# Patient Record
Sex: FEMALE | Race: WHITE | NOT HISPANIC OR LATINO | Employment: FULL TIME | URBAN - METROPOLITAN AREA
[De-identification: names, ages, dates, MRNs, and addresses within clinical notes are randomized per-mention and may not be internally consistent; named-entity substitution may affect disease eponyms.]

---

## 2020-09-16 NOTE — PROGRESS NOTES
CC: Well Woman -hysterectomy with BSO    HPI:  Patient is a 59 y.o.   who presents for her well woman exam today.  History reviewed with patient and changes noted.  Patient without complaints or concerns today.    Past Medical History:   Diagnosis Date    Asthma     Atrophic vaginitis     insuff paps- used intrarosa    Colon polyps     DM (diabetes mellitus)     GERD (gastroesophageal reflux disease)     HTN (hypertension)     Hypercholesterolemia     Menopausal state 2010    hyst/bso    Psoriasis        Past Surgical History:   Procedure Laterality Date    BILATERAL TUBAL LIGATION      BREAST BIOPSY  2018    benign w/ clip [facility]    CHOLECYSTECTOMY      DILATION AND CURETTAGE OF UTERUS      Ear surgery, unspecified Right 2018    LAPAROSCOPY-ASSISTED VAGINAL HYSTERECTOMY  2010    with BSO    OVARIAN CYST REMOVAL         Social History     Tobacco Use    Smoking status: Never Smoker    Smokeless tobacco: Never Used   Substance Use Topics    Alcohol use: Yes     Comment: rarely    Drug use: Never       Family History   Problem Relation Age of Onset    Cancer Brother 58        kidney/urinary tract       Review of patient's allergies indicates:   Allergen Reactions    Pcn [penicillins]          Current Outpatient Medications:     budesonide-formoterol 160-4.5 mcg (SYMBICORT) 160-4.5 mcg/actuation HFAA, Inhale 2 puffs into the lungs every 12 (twelve) hours. Controller, Disp: , Rfl:     empagliflozin-metformin (SYNJARDY) 12.5-1,000 mg Tab, Take by mouth 2 (two) times daily., Disp: , Rfl:     glyBURIDE (DIABETA) 5 MG tablet, Take 5 mg by mouth daily with breakfast., Disp: , Rfl:     losartan (COZAAR) 50 MG tablet, Take 50 mg by mouth once daily., Disp: , Rfl:     omeprazole (PRILOSEC) 40 MG capsule, Take 40 mg by mouth once daily., Disp: , Rfl:     atorvastatin (LIPITOR) 40 MG tablet, , Disp: , Rfl:     losartan-hydrochlorothiazide 50-12.5 mg (HYZAAR) 50-12.5 mg per tablet, ,  Disp: , Rfl:     OB History        2    Para   2    Term   2            AB        Living   2       SAB        TAB        Ectopic        Multiple        Live Births   2                  GYN HX:    HX ABNL PAPS:  no abnormals    HX OF STDS:  none    HRT USE: yes- vaginal intrarosa    HYSTERECTOMY SINCE:  for bleeding and fibroids with BSO    HEALTH MAINTENANCE:  (PCP-Brian Quintana MD)    LAST ANNUAL/ PAP:   2019       LAST PAP:  neg    LAST MMG:  2019  normal at [facility]    LAST LABS:  in the last few months with pcp    LAST COLON: 2016- polyps- Q 5 yrs- w/ Dr. Vargas    LAST DEXA: will do with mmg at [facility]    ROS:  Review of Systems   Constitutional: Negative for activity change, appetite change, chills, fatigue, fever and unexpected weight change.   Respiratory: Negative for cough and shortness of breath.    Cardiovascular: Negative for chest pain and leg swelling.   Gastrointestinal: Negative for abdominal pain, bloating, blood in stool, constipation, diarrhea, nausea and vomiting.   Endocrine: Negative for hair loss and hot flashes.   Genitourinary: Negative for decreased libido, dyspareunia, dysuria, flank pain, frequency, genital sores, hematuria, hot flashes, pelvic pain, urgency, vaginal bleeding, vaginal discharge, vaginal pain, urinary incontinence, postmenopausal bleeding, vaginal dryness and vaginal odor.   Musculoskeletal: Negative for arthralgias and joint swelling.   Integumentary:  Negative for rash, acne, mole/lesion, breast mass, nipple discharge, breast skin changes and breast tenderness.   Neurological: Negative for headaches.   Psychiatric/Behavioral: Negative for depression and sleep disturbance. The patient is not nervous/anxious.    Breast: Negative for asymmetry, lump, mass, nipple discharge, skin changes and tenderness      VITALS:  No LMP recorded. Patient has had a hysterectomy.  Vitals:    20 1433   BP: 105/61   Pulse: 78   Weight: 75.8  "kg (167 lb 3.2 oz)   Height: 5' 9" (1.753 m)     Body mass index is 24.69 kg/m².     PHYSICAL EXAM:  Physical Exam:   Constitutional: She is oriented to person, place, and time. She appears well-developed and well-nourished. No distress.    HENT:   Head: Normocephalic and atraumatic.     Neck: No thyroid mass present.    Cardiovascular: Normal rate.     Pulmonary/Chest: Effort normal. No respiratory distress. She exhibits no deformity. Right breast exhibits no inverted nipple, no mass, no nipple discharge, no skin change, no tenderness, no bleeding and no swelling. Left breast exhibits no inverted nipple, no mass, no nipple discharge, no skin change, no tenderness, no bleeding and no swelling. Breasts are symmetrical.        Abdominal: Soft. Normal appearance. She exhibits no distension. There is no abdominal tenderness.     Genitourinary:    Vagina normal.      Pelvic exam was performed with patient supine.   There is no rash, tenderness, lesion or injury on the right labia. There is no rash, tenderness, lesion or injury on the left labia. Uterus is absent. Right adnexum displays no mass, no tenderness and no fullness. Left adnexum displays no mass, no tenderness and no fullness. No erythema, tenderness, bleeding, rectocele, cystocele or unspecified prolapse of vaginal walls in the vagina.    No foreign body in the vagina.      No signs of injury in the vagina.   Vaginal cuff normal.Labial bartholins normal.Cervix exhibits absence. negative for vaginal discharge              Neurological: She is alert and oriented to person, place, and time.    Skin: Skin is warm and dry. No lesion and no rash noted.    Psychiatric: She has a normal mood and affect. Her speech is normal and behavior is normal. Judgment and thought content normal.     *female chaperone present for exam    ASSESSMENT and PLAN:  Encounter for well woman exam with routine gynecological exam  -     Liquid-based pap smear, screening    Menopausal " state    Atrophic vaginitis       FOLLOWUP:  Follow up in about 1 year (around 9/17/2021) for wwe.     COUNSELING:  Patient was counseled today on A.C.S. Pap guidelines and recommendations for yearly pelvic exam, monthly self breast exams, annual mammograms, and screening colonoscopy starting at age 50. Encouraged patient to see her PCP for other health maintenance.

## 2020-09-17 ENCOUNTER — OFFICE VISIT (OUTPATIENT)
Dept: OBSTETRICS AND GYNECOLOGY | Facility: CLINIC | Age: 60
End: 2020-09-17
Payer: COMMERCIAL

## 2020-09-17 VITALS
HEART RATE: 78 BPM | SYSTOLIC BLOOD PRESSURE: 105 MMHG | WEIGHT: 167.19 LBS | BODY MASS INDEX: 24.76 KG/M2 | HEIGHT: 69 IN | DIASTOLIC BLOOD PRESSURE: 61 MMHG

## 2020-09-17 DIAGNOSIS — N95.1 MENOPAUSAL STATE: ICD-10-CM

## 2020-09-17 DIAGNOSIS — N95.2 ATROPHIC VAGINITIS: ICD-10-CM

## 2020-09-17 DIAGNOSIS — Z01.419 ENCOUNTER FOR WELL WOMAN EXAM WITH ROUTINE GYNECOLOGICAL EXAM: Primary | ICD-10-CM

## 2020-09-17 PROBLEM — K21.9 GASTRIC REFLUX: Status: ACTIVE | Noted: 2020-09-17

## 2020-09-17 PROBLEM — E78.5 HYPERLIPIDEMIA: Status: ACTIVE | Noted: 2017-01-01

## 2020-09-17 PROBLEM — N84.9 POLYP OF FEMALE GENITAL TRACT, UNSPECIFIED: Status: ACTIVE | Noted: 2020-09-17

## 2020-09-17 PROCEDURE — 3008F PR BODY MASS INDEX (BMI) DOCUMENTED: ICD-10-PCS | Mod: CPTII,S$GLB,, | Performed by: OBSTETRICS & GYNECOLOGY

## 2020-09-17 PROCEDURE — 99396 PR PREVENTIVE VISIT,EST,40-64: ICD-10-PCS | Mod: S$GLB,,, | Performed by: OBSTETRICS & GYNECOLOGY

## 2020-09-17 PROCEDURE — 99396 PREV VISIT EST AGE 40-64: CPT | Mod: S$GLB,,, | Performed by: OBSTETRICS & GYNECOLOGY

## 2020-09-17 PROCEDURE — 3008F BODY MASS INDEX DOCD: CPT | Mod: CPTII,S$GLB,, | Performed by: OBSTETRICS & GYNECOLOGY

## 2020-09-17 RX ORDER — EMPAGLIFLOZIN AND METFORMIN HYDROCHLORIDE 12.5; 1 MG/1; MG/1
TABLET ORAL 2 TIMES DAILY
COMMUNITY
End: 2021-09-27

## 2020-09-17 RX ORDER — LOSARTAN POTASSIUM AND HYDROCHLOROTHIAZIDE 12.5; 5 MG/1; MG/1
TABLET ORAL
COMMUNITY
End: 2021-09-27

## 2020-09-17 RX ORDER — ATORVASTATIN CALCIUM 40 MG/1
TABLET, FILM COATED ORAL
COMMUNITY
Start: 2020-06-18 | End: 2023-05-15

## 2021-09-27 ENCOUNTER — OFFICE VISIT (OUTPATIENT)
Dept: OBSTETRICS AND GYNECOLOGY | Facility: CLINIC | Age: 61
End: 2021-09-27
Payer: COMMERCIAL

## 2021-09-27 VITALS
WEIGHT: 170.81 LBS | DIASTOLIC BLOOD PRESSURE: 83 MMHG | HEART RATE: 96 BPM | SYSTOLIC BLOOD PRESSURE: 113 MMHG | BODY MASS INDEX: 25.3 KG/M2 | HEIGHT: 69 IN

## 2021-09-27 DIAGNOSIS — N95.1 MENOPAUSAL STATE: ICD-10-CM

## 2021-09-27 DIAGNOSIS — Z01.419 ENCOUNTER FOR WELL WOMAN EXAM WITH ROUTINE GYNECOLOGICAL EXAM: Primary | ICD-10-CM

## 2021-09-27 DIAGNOSIS — B37.2 YEAST DERMATITIS: ICD-10-CM

## 2021-09-27 DIAGNOSIS — N95.2 ATROPHIC VAGINITIS: ICD-10-CM

## 2021-09-27 PROCEDURE — 1159F MED LIST DOCD IN RCRD: CPT | Mod: CPTII,S$GLB,, | Performed by: OBSTETRICS & GYNECOLOGY

## 2021-09-27 PROCEDURE — 99396 PR PREVENTIVE VISIT,EST,40-64: ICD-10-PCS | Mod: S$GLB,,, | Performed by: OBSTETRICS & GYNECOLOGY

## 2021-09-27 PROCEDURE — 3008F BODY MASS INDEX DOCD: CPT | Mod: CPTII,S$GLB,, | Performed by: OBSTETRICS & GYNECOLOGY

## 2021-09-27 PROCEDURE — 3074F PR MOST RECENT SYSTOLIC BLOOD PRESSURE < 130 MM HG: ICD-10-PCS | Mod: CPTII,S$GLB,, | Performed by: OBSTETRICS & GYNECOLOGY

## 2021-09-27 PROCEDURE — 4010F PR ACE/ARB THEARPY RXD/TAKEN: ICD-10-PCS | Mod: CPTII,S$GLB,, | Performed by: OBSTETRICS & GYNECOLOGY

## 2021-09-27 PROCEDURE — 1160F RVW MEDS BY RX/DR IN RCRD: CPT | Mod: CPTII,S$GLB,, | Performed by: OBSTETRICS & GYNECOLOGY

## 2021-09-27 PROCEDURE — 3008F PR BODY MASS INDEX (BMI) DOCUMENTED: ICD-10-PCS | Mod: CPTII,S$GLB,, | Performed by: OBSTETRICS & GYNECOLOGY

## 2021-09-27 PROCEDURE — 1159F PR MEDICATION LIST DOCUMENTED IN MEDICAL RECORD: ICD-10-PCS | Mod: CPTII,S$GLB,, | Performed by: OBSTETRICS & GYNECOLOGY

## 2021-09-27 PROCEDURE — 4010F ACE/ARB THERAPY RXD/TAKEN: CPT | Mod: CPTII,S$GLB,, | Performed by: OBSTETRICS & GYNECOLOGY

## 2021-09-27 PROCEDURE — 3074F SYST BP LT 130 MM HG: CPT | Mod: CPTII,S$GLB,, | Performed by: OBSTETRICS & GYNECOLOGY

## 2021-09-27 PROCEDURE — 1160F PR REVIEW ALL MEDS BY PRESCRIBER/CLIN PHARMACIST DOCUMENTED: ICD-10-PCS | Mod: CPTII,S$GLB,, | Performed by: OBSTETRICS & GYNECOLOGY

## 2021-09-27 PROCEDURE — 3079F PR MOST RECENT DIASTOLIC BLOOD PRESSURE 80-89 MM HG: ICD-10-PCS | Mod: CPTII,S$GLB,, | Performed by: OBSTETRICS & GYNECOLOGY

## 2021-09-27 PROCEDURE — 3079F DIAST BP 80-89 MM HG: CPT | Mod: CPTII,S$GLB,, | Performed by: OBSTETRICS & GYNECOLOGY

## 2021-09-27 PROCEDURE — 99396 PREV VISIT EST AGE 40-64: CPT | Mod: S$GLB,,, | Performed by: OBSTETRICS & GYNECOLOGY

## 2021-09-27 RX ORDER — FLUCONAZOLE 200 MG/1
200 TABLET ORAL DAILY
Qty: 3 TABLET | Refills: 6 | Status: SHIPPED | OUTPATIENT
Start: 2021-09-27 | End: 2021-09-30

## 2021-09-27 RX ORDER — CLOTRIMAZOLE AND BETAMETHASONE DIPROPIONATE 10; .64 MG/G; MG/G
CREAM TOPICAL
COMMUNITY
Start: 2021-08-02

## 2021-09-27 RX ORDER — CLOBETASOL PROPIONATE 0.05 G/ML
SPRAY TOPICAL
COMMUNITY
Start: 2021-08-20

## 2021-09-27 RX ORDER — EMPAGLIFLOZIN AND METFORMIN HYDROCHLORIDE 5; 1000 MG/1; MG/1
TABLET ORAL
COMMUNITY
Start: 2021-09-09

## 2021-09-27 RX ORDER — PRASTERONE 6.5 MG/1
6.5 INSERT VAGINAL NIGHTLY
Qty: 28 EACH | Refills: 12 | Status: SHIPPED | OUTPATIENT
Start: 2021-09-27 | End: 2023-05-15

## 2023-05-15 ENCOUNTER — OFFICE VISIT (OUTPATIENT)
Dept: GASTROENTEROLOGY | Facility: CLINIC | Age: 63
End: 2023-05-15
Payer: COMMERCIAL

## 2023-05-15 VITALS
OXYGEN SATURATION: 99 % | HEIGHT: 68 IN | HEART RATE: 76 BPM | WEIGHT: 134 LBS | SYSTOLIC BLOOD PRESSURE: 147 MMHG | BODY MASS INDEX: 20.31 KG/M2 | DIASTOLIC BLOOD PRESSURE: 75 MMHG

## 2023-05-15 DIAGNOSIS — Z86.010 HISTORY OF COLON POLYPS: ICD-10-CM

## 2023-05-15 DIAGNOSIS — K21.9 GASTROESOPHAGEAL REFLUX DISEASE, UNSPECIFIED WHETHER ESOPHAGITIS PRESENT: ICD-10-CM

## 2023-05-15 DIAGNOSIS — R13.12 OROPHARYNGEAL DYSPHAGIA: Primary | ICD-10-CM

## 2023-05-15 PROCEDURE — 4010F ACE/ARB THERAPY RXD/TAKEN: CPT | Mod: CPTII,S$GLB,, | Performed by: INTERNAL MEDICINE

## 2023-05-15 PROCEDURE — 3078F DIAST BP <80 MM HG: CPT | Mod: CPTII,S$GLB,, | Performed by: INTERNAL MEDICINE

## 2023-05-15 PROCEDURE — 1160F PR REVIEW ALL MEDS BY PRESCRIBER/CLIN PHARMACIST DOCUMENTED: ICD-10-PCS | Mod: CPTII,S$GLB,, | Performed by: INTERNAL MEDICINE

## 2023-05-15 PROCEDURE — 99203 OFFICE O/P NEW LOW 30 MIN: CPT | Mod: S$GLB,,, | Performed by: INTERNAL MEDICINE

## 2023-05-15 PROCEDURE — 1159F MED LIST DOCD IN RCRD: CPT | Mod: CPTII,S$GLB,, | Performed by: INTERNAL MEDICINE

## 2023-05-15 PROCEDURE — 3008F BODY MASS INDEX DOCD: CPT | Mod: CPTII,S$GLB,, | Performed by: INTERNAL MEDICINE

## 2023-05-15 PROCEDURE — 3077F PR MOST RECENT SYSTOLIC BLOOD PRESSURE >= 140 MM HG: ICD-10-PCS | Mod: CPTII,S$GLB,, | Performed by: INTERNAL MEDICINE

## 2023-05-15 PROCEDURE — 4010F PR ACE/ARB THEARPY RXD/TAKEN: ICD-10-PCS | Mod: CPTII,S$GLB,, | Performed by: INTERNAL MEDICINE

## 2023-05-15 PROCEDURE — 3077F SYST BP >= 140 MM HG: CPT | Mod: CPTII,S$GLB,, | Performed by: INTERNAL MEDICINE

## 2023-05-15 PROCEDURE — 1159F PR MEDICATION LIST DOCUMENTED IN MEDICAL RECORD: ICD-10-PCS | Mod: CPTII,S$GLB,, | Performed by: INTERNAL MEDICINE

## 2023-05-15 PROCEDURE — 3008F PR BODY MASS INDEX (BMI) DOCUMENTED: ICD-10-PCS | Mod: CPTII,S$GLB,, | Performed by: INTERNAL MEDICINE

## 2023-05-15 PROCEDURE — 3078F PR MOST RECENT DIASTOLIC BLOOD PRESSURE < 80 MM HG: ICD-10-PCS | Mod: CPTII,S$GLB,, | Performed by: INTERNAL MEDICINE

## 2023-05-15 PROCEDURE — 1160F RVW MEDS BY RX/DR IN RCRD: CPT | Mod: CPTII,S$GLB,, | Performed by: INTERNAL MEDICINE

## 2023-05-15 PROCEDURE — 99203 PR OFFICE/OUTPT VISIT, NEW, LEVL III, 30-44 MIN: ICD-10-PCS | Mod: S$GLB,,, | Performed by: INTERNAL MEDICINE

## 2023-05-15 RX ORDER — SOD SULF/POT CHLORIDE/MAG SULF 1.479 G
12 TABLET ORAL DAILY
Qty: 24 TABLET | Refills: 0 | Status: SHIPPED | OUTPATIENT
Start: 2023-05-15

## 2023-05-15 RX ORDER — FLUTICASONE PROPIONATE 50 MCG
SPRAY, SUSPENSION (ML) NASAL
COMMUNITY
Start: 2023-01-23

## 2023-05-15 RX ORDER — BLOOD SUGAR DIAGNOSTIC
STRIP MISCELLANEOUS
COMMUNITY
Start: 2023-04-24

## 2023-05-15 RX ORDER — CYCLOBENZAPRINE HCL 5 MG
TABLET ORAL
COMMUNITY
Start: 2023-02-24 | End: 2023-12-07

## 2023-05-15 RX ORDER — DOXYCYCLINE 50 MG/1
CAPSULE ORAL
COMMUNITY
Start: 2023-01-09 | End: 2023-12-07

## 2023-05-15 RX ORDER — TIRZEPATIDE 5 MG/.5ML
INJECTION, SOLUTION SUBCUTANEOUS
COMMUNITY
Start: 2023-04-03

## 2023-05-15 NOTE — PATIENT INSTRUCTIONS
Schedule EGD w/dil/Colonoscopy.    Please notify my office if you have not been contacted within two weeks after any procedures, submitting any samples (biopsies, blood, stool, urine, etc.) or after any imaging (X-ray, CT, MRI, etc.).

## 2023-05-15 NOTE — LETTER
May 15, 2023        Brian Quintana MD  [address]  [place] [address]             [facility]  [address] [address]  Phone: [phone]  Fax: [phone]   Patient: Chantelle Blair   MR Number: [id number]   YOB: 1960   Date of Visit: 5/15/2023       Dear Dr. Quintana:    Thank you for referring Chantelle Blair to me for evaluation. Attached you will find relevant portions of my assessment and plan of care.    If you have questions, please do not hesitate to call me. I look forward to following Chantelle Blair along with you.    Sincerely,      Fortino Vargas MD            CC  No Recipients    Enclosure

## 2023-05-15 NOTE — PROGRESS NOTES
Clinic Note    Reason for visit:  The primary encounter diagnosis was Oropharyngeal dysphagia. Diagnoses of Gastroesophageal reflux disease, unspecified whether esophagitis present and History of colon polyps were also pertinent to this visit.    PCP: Brian Quintana       HPI:  This is a 62 y.o. female who was last seen in 2016. Patient reports overall doing well. She is due for colonoscopy. She denies blood in stool. She does report intermittent oropharyngeal dysphagia with solids. She takes omeprazole 40 mg daily.       Colonoscopy 3/14/2016: polyps ok-repeat colon in 5 years    Review of Systems   Constitutional:  Negative for fatigue, fever and unexpected weight change.   HENT:  Positive for trouble swallowing. Negative for mouth sores, postnasal drip and sore throat.    Eyes:  Negative for pain, discharge and eye dryness.   Respiratory:  Negative for apnea, cough, choking, chest tightness, shortness of breath and wheezing.    Cardiovascular:  Negative for chest pain, palpitations and leg swelling.   Gastrointestinal:  Negative for abdominal distention, abdominal pain, anal bleeding, blood in stool, change in bowel habit, constipation, diarrhea, nausea, rectal pain, vomiting, reflux, fecal incontinence and change in bowel habit.   Genitourinary:  Negative for bladder incontinence, dysuria and hematuria.   Musculoskeletal:  Negative for arthralgias, back pain and joint swelling.   Integumentary:  Negative for color change and rash.   Allergic/Immunologic: Negative for environmental allergies and food allergies.   Neurological:  Negative for seizures and headaches.   Hematological:  Negative for adenopathy. Does not bruise/bleed easily.      Past Medical History:   Diagnosis Date    Asthma     Atrophic vaginitis     insuff paps- used intrarosa    Colon polyps     DM (diabetes mellitus)     GERD (gastroesophageal reflux disease)     HTN (hypertension)     Hypercholesterolemia     Menopausal state 2010    hyst/bso     Psoriasis      Past Surgical History:   Procedure Laterality Date    BILATERAL TUBAL LIGATION  1985    BREAST BIOPSY  2018    benign w/ clip placed at [facility]    CHOLECYSTECTOMY      DILATION AND CURETTAGE OF UTERUS      Ear surgery, unspecified Right 2018    LAPAROSCOPY-ASSISTED VAGINAL HYSTERECTOMY  2010    with BSO for bleeding and fibroids    OVARIAN CYST REMOVAL  1996    benign cyst removed-size of grapefruit     Family History   Problem Relation Age of Onset    Cancer Brother 58        kidney/urinary tract    No Known Problems Father     No Known Problems Mother      Social History     Tobacco Use    Smoking status: Former    Smokeless tobacco: Never   Substance Use Topics    Alcohol use: Yes     Comment: rarely    Drug use: Never     Review of patient's allergies indicates:   Allergen Reactions    Pcn [penicillins]         Medication List with Changes/Refills   New Medications    SOD SULF-POT CHLORIDE-MAG SULF (SUTAB) 1.479-0.188- 0.225 GRAM TABLET    Take 12 tablets by mouth once daily. Take according to package instructions with indicated amount of water. No breakfast day before test. May substitute with Suprep, Clenpiq, Plenvu, Moviprep or GoLytely based on Rx plan and patient preference.   Current Medications    BUDESONIDE-FORMOTEROL 160-4.5 MCG (SYMBICORT) 160-4.5 MCG/ACTUATION HFAA    Inhale 2 puffs into the lungs every 12 (twelve) hours. Controller    CLOBETASOL (CLOBEX) 0.05 % TOPICAL SPRAY        CLOTRIMAZOLE-BETAMETHASONE 1-0.05% (LOTRISONE) CREAM        CYCLOBENZAPRINE (FLEXERIL) 5 MG TABLET        DOXYCYCLINE (MONODOX) 50 MG CAP        FLUTICASONE PROPIONATE (FLONASE) 50 MCG/ACTUATION NASAL SPRAY        LOSARTAN (COZAAR) 50 MG TABLET    Take 50 mg by mouth once daily.    OMEPRAZOLE (PRILOSEC) 40 MG CAPSULE    Take 40 mg by mouth once daily.    ONETOUCH ULTRA TEST STRP        SYNJARDY 5-1,000 MG TAB        TIRZEPATIDE (MOUNJARO) 5 MG/0.5 ML PNIJ       Discontinued Medications    ATORVASTATIN  "(LIPITOR) 40 MG TABLET        GLYBURIDE (DIABETA) 5 MG TABLET    Take 5 mg by mouth daily with breakfast.    PRASTERONE, DHEA, (INTRAROSA) 6.5 MG INST    Place 6.5 mg vaginally nightly.         Vital Signs:  BP (!) 147/75   Pulse 76   Ht 5' 8" (1.727 m)   Wt 60.8 kg (134 lb)   SpO2 99%   BMI 20.37 kg/m²         Physical Exam  Vitals reviewed.   Constitutional:       General: She is awake. She is not in acute distress.     Appearance: Normal appearance. She is well-developed. She is not ill-appearing, toxic-appearing or diaphoretic.   HENT:      Head: Normocephalic and atraumatic.      Nose: Nose normal.      Mouth/Throat:      Mouth: Mucous membranes are moist.      Pharynx: Oropharynx is clear. No oropharyngeal exudate or posterior oropharyngeal erythema.   Eyes:      General: Lids are normal. Gaze aligned appropriately. No scleral icterus.        Right eye: No discharge.         Left eye: No discharge.      Conjunctiva/sclera: Conjunctivae normal.   Neck:      Trachea: Trachea normal.   Cardiovascular:      Rate and Rhythm: Normal rate and regular rhythm.      Pulses:           Radial pulses are 2+ on the right side and 2+ on the left side.   Pulmonary:      Effort: Pulmonary effort is normal. No respiratory distress.      Breath sounds: No stridor. No wheezing.   Chest:      Chest wall: No tenderness.   Abdominal:      General: Bowel sounds are normal. There is no distension.      Palpations: Abdomen is soft. There is no fluid wave, hepatomegaly or mass.      Tenderness: There is no abdominal tenderness. There is no guarding or rebound.   Musculoskeletal:         General: No tenderness or deformity.      Cervical back: Full passive range of motion without pain and neck supple. No tenderness.      Right lower leg: No edema.      Left lower leg: No edema.   Lymphadenopathy:      Cervical: No cervical adenopathy.   Skin:     General: Skin is warm and dry.      Capillary Refill: Capillary refill takes less than " 2 seconds.      Coloration: Skin is not cyanotic, jaundiced or pale.   Neurological:      General: No focal deficit present.      Mental Status: She is alert and oriented to person, place, and time.      Motor: No tremor.   Psychiatric:         Attention and Perception: Attention normal.         Mood and Affect: Mood and affect normal.         Speech: Speech normal.         Behavior: Behavior normal. Behavior is cooperative.          All of the data above and below has been reviewed by myself and any further interpretations will be reflected in the assessment and plan.   The data includes review of external notes, and independent interpretation of lab results, procedures, x-rays, and imaging reports.      Assessment:  Oropharyngeal dysphagia  -     Ambulatory Referral to External Surgery    Gastroesophageal reflux disease, unspecified whether esophagitis present  -     Ambulatory Referral to External Surgery    History of colon polyps  -     Ambulatory Referral to External Surgery  -     sod sulf-pot chloride-mag sulf (SUTAB) 1.479-0.188- 0.225 gram tablet; Take 12 tablets by mouth once daily. Take according to package instructions with indicated amount of water. No breakfast day before test. May substitute with Suprep, Clenpiq, Plenvu, Moviprep or GoLytely based on Rx plan and patient preference.  Dispense: 24 tablet; Refill: 0         Recommendations:  Schedule EGD w/dil/[place] [place] [facility] [place] [facility]ab    Risks, benefits, and alternatives of medical management, any associated procedures, and/or treatment discussed with the patient. Patient given opportunity to ask questions and voices understanding. Patient has elected to proceed with the recommended care modalities as discussed.    Follow up if symptoms worsen or fail to improve.    Order summary:  Orders Placed This Encounter   Procedures    Ambulatory Referral to External Surgery        Instructed patient to notify my office if they have not been contacted within  two weeks after any procedures, submitting any samples (biopsies, blood, stool, urine, etc.) or after any imaging (X-ray, CT, MRI, etc.).     Fortino Vargas MD    This document may have been created using a voice recognition transcribing system. Incorrect words or phrases may have been missed during proofreading. Please interpret accordingly or contact me for clarification.

## 2023-06-15 ENCOUNTER — TELEPHONE (OUTPATIENT)
Dept: ADMINISTRATIVE | Facility: CLINIC | Age: 63
End: 2023-06-15
Payer: COMMERCIAL

## 2023-06-15 DIAGNOSIS — R13.12 OROPHARYNGEAL DYSPHAGIA: Primary | ICD-10-CM

## 2023-06-15 DIAGNOSIS — K21.9 GASTROESOPHAGEAL REFLUX DISEASE, UNSPECIFIED WHETHER ESOPHAGITIS PRESENT: ICD-10-CM

## 2023-06-15 DIAGNOSIS — Z86.010 HISTORY OF COLON POLYPS: ICD-10-CM

## 2023-06-15 NOTE — PROGRESS NOTES
"[facility]  [address] [place] [address]  Phone: [phone]  Fax: [phone]    History & Physical         Provider: Dr. Fortino Vargas    Patient Name: Chantelle MARIN (age):1960  62 y.o.           Gender: female   Phone: [phone]     Referring Physician: Brian Quintana     Vital Signs:   Height - 5' 8"  Weight - 134 lbs  BMI -  20.4    Plan: EGD w/ DIL / Colonoscopy    Encounter Diagnoses   Name Primary?    Oropharyngeal dysphagia Yes    History of colon polyps     Gastroesophageal reflux disease, unspecified whether esophagitis present            History:      Past Medical History:   Diagnosis Date    Asthma     Atrophic vaginitis     insuff paps- used intrarosa    Colon polyps     DM (diabetes mellitus)     GERD (gastroesophageal reflux disease)     HTN (hypertension)     Hypercholesterolemia     Menopausal state 2010    hyst/bso    Psoriasis       Past Surgical History:   Procedure Laterality Date    BILATERAL TUBAL LIGATION  1985    BREAST BIOPSY  2018    benign w/ clip [facility]    CHOLECYSTECTOMY      DILATION AND CURETTAGE OF UTERUS      Ear surgery, unspecified Right 2018    LAPAROSCOPY-ASSISTED VAGINAL HYSTERECTOMY  2010    with BSO for bleeding and fibroids    OVARIAN CYST REMOVAL      benign cyst removed-size of grapefruit      Medication List with Changes/Refills   Current Medications    BUDESONIDE-FORMOTEROL 160-4.5 MCG (SYMBICORT) 160-4.5 MCG/ACTUATION HFAA    Inhale 2 puffs into the lungs every 12 (twelve) hours. Controller    CLOBETASOL (CLOBEX) 0.05 % TOPICAL SPRAY        CLOTRIMAZOLE-BETAMETHASONE 1-0.05% (LOTRISONE) CREAM        CYCLOBENZAPRINE (FLEXERIL) 5 MG TABLET        DOXYCYCLINE (MONODOX) 50 MG CAP        FLUTICASONE PROPIONATE (FLONASE) 50 MCG/ACTUATION NASAL SPRAY        LOSARTAN (COZAAR) 50 MG TABLET    Take 50 mg by mouth once daily.    " OMEPRAZOLE (PRILOSEC) 40 MG CAPSULE    Take 40 mg by mouth once daily.    ONETOUCH ULTRA TEST STRP        SOD SULF-POT CHLORIDE-MAG SULF (SUTAB) 1.479-0.188- 0.225 GRAM TABLET    Take 12 tablets by mouth once daily. Take according to package instructions with indicated amount of water. No breakfast day before test. May substitute with Suprep, Clenpiq, Plenvu, Moviprep or GoLytely based on Rx plan and patient preference.    SYNJARDY 5-1,000 MG TAB        TIRZEPATIDE (MOUNJARO) 5 MG/0.5 ML PNIJ          Review of patient's allergies indicates:   Allergen Reactions    Pcn [penicillins]       Family History   Problem Relation Age of Onset    Cancer Brother 58        kidney/urinary tract    No Known Problems Father     No Known Problems Mother       Social History     Tobacco Use    Smoking status: Former    Smokeless tobacco: Never   Substance Use Topics    Alcohol use: Yes     Comment: rarely    Drug use: Never        Physical Examination:     General Appearance:___________________________  HEENT: _____________________________________  Abdomen:____________________________________  Heart:________________________________________  Lungs:_______________________________________  Extremities:___________________________________  Skin:_________________________________________  Endocrine:____________________________________  Genitourinary:_________________________________  Neurological:__________________________________      Patient has been evaluated immediately prior to sedation and is medically cleared for endoscopy with IVCS as an ASA class: ______      Physician Signature: _________________________       Date: ________  Time: ________

## 2023-06-21 ENCOUNTER — OUTSIDE PLACE OF SERVICE (OUTPATIENT)
Dept: GASTROENTEROLOGY | Facility: CLINIC | Age: 63
End: 2023-06-21

## 2023-06-21 LAB — CRC RECOMMENDATION EXT: NORMAL

## 2023-06-21 PROCEDURE — 43450 DILATE ESOPHAGUS 1/MULT PASS: CPT | Mod: 51,,, | Performed by: INTERNAL MEDICINE

## 2023-06-21 PROCEDURE — 45385 PR COLONOSCOPY,REMV LESN,SNARE: ICD-10-PCS | Mod: 33,,, | Performed by: INTERNAL MEDICINE

## 2023-06-21 PROCEDURE — 43239 EGD BIOPSY SINGLE/MULTIPLE: CPT | Mod: 51,,, | Performed by: INTERNAL MEDICINE

## 2023-06-21 PROCEDURE — 43450 PR DILATE ESOPHAGUS: ICD-10-PCS | Mod: 51,,, | Performed by: INTERNAL MEDICINE

## 2023-06-21 PROCEDURE — 43239 PR EGD, FLEX, W/BIOPSY, SGL/MULTI: ICD-10-PCS | Mod: 51,,, | Performed by: INTERNAL MEDICINE

## 2023-06-21 PROCEDURE — 45385 COLONOSCOPY W/LESION REMOVAL: CPT | Mod: 33,,, | Performed by: INTERNAL MEDICINE

## 2023-07-19 ENCOUNTER — DOCUMENTATION ONLY (OUTPATIENT)
Dept: GASTROENTEROLOGY | Facility: CLINIC | Age: 63
End: 2023-07-19
Payer: COMMERCIAL

## 2023-12-06 NOTE — PROGRESS NOTES
PROBLEM VISIT - menop/ hyst with BSO  Patient Care Team:  Brian Quintana MD as PCP - General (Family Medicine)  Fortino Vargas MD as Consulting Physician (Gastroenterology)    CC:  bump-possible boil  HPI:  Pt is a 63 y.o.  who had a tender red spot in inguinal area so googled it and used hot compresses and abx ointment so has improved a lot and no longer tender    The patient's last visit with me was on 2021 for wwe    REVIEW OF PRIOR DATA/ HEALTH MAINTENANCE:  LAST ANNUAL:   2021   LAST LABS- does with PCP   LAST MMG (screening)- 2023-  [facility]    LAST COLONOSCOPY- - by Dr. Vargas  -q 5 yrs for polyps   LAST DEXA- never    HX ABNL PAPS: none    HYST for bleeding and fibroids in   HRT:  never used    Past Medical History:   Diagnosis Date    Asthma     Atrophic vaginitis     insuff paps- used intrarosa    Colon polyps     DM (diabetes mellitus)     GERD (gastroesophageal reflux disease)     HTN (hypertension)     Hypercholesterolemia     Menopausal state     hyst/bso    Psoriasis        SURGICAL HX:   has a past surgical history that includes Ovarian cyst removal (); Ear surgery, unspecified (Right, 2018); Cholecystectomy; Dilation and curettage of uterus; Bilateral tubal ligation (); Laparoscopy-assisted vaginal hysterectomy (); and Breast biopsy ().    Family, obstetric, and social history reviewed and updated    ALLERGIES: Pcn [penicillins]  Current Outpatient Medications   Medication Instructions    atorvastatin (LIPITOR) 20 MG tablet     budesonide-formoterol 160-4.5 mcg (SYMBICORT) 160-4.5 mcg/actuation HFAA 2 puffs, Inhalation, Every 12 hours, Controller    cevimeline (EVOXAC) 30 mg capsule     chlorhexidine (PERIDEX) 0.12 % solution     clobetasoL (CLOBEX) 0.05 % topical spray No dose, route, or frequency recorded.    clotrimazole-betamethasone 1-0.05% (LOTRISONE) cream No dose, route, or frequency recorded.    fluticasone propionate  "(FLONASE) 50 mcg/actuation nasal spray No dose, route, or frequency recorded.    losartan (COZAAR) 50 mg, Oral, Daily    omeprazole (PRILOSEC) 40 mg, Oral, Daily    ONETOUCH ULTRA TEST Strp No dose, route, or frequency recorded.    sod sulf-pot chloride-mag sulf (SUTAB) 1.479-0.188- 0.225 gram tablet 12 tablets, Oral, Daily, Take according to package instructions with indicated amount of water. No breakfast day before test. May substitute with Suprep, Clenpiq, Plenvu, Moviprep or GoLytely based on Rx plan and patient preference.    SYNJARDY 5-1,000 mg Tab No dose, route, or frequency recorded.    tirzepatide (MOUNJARO) 5 mg/0.5 mL PnIj No dose, route, or frequency recorded.       ROS:  CONST:  No fever, chills, fatigue or unexpected changes in weight   CV: No chest pain or palpitations   RESP:  No shortness of breath or cough   GI: No abd pain, vomiting, diarrhea, blood in stool, or changes in bowel mvmts   SKIN: No rashes or lesions  MUSCULOSKELETAL: No joint swelling or pain   PSYCH: No changes in mood or insomia   BREASTS: No asymmetry, lumps, pain, nipple discharge, or skin changes   :  No dysuria, urgency, frequency, hematuria or incontinence           No vag dc, itching, odor or dryness           No pelvic pain, dyspareunia, or abnormal vaginal bleeding     VITALS:  Blood pressure (!) 147/84, height 5' 8" (1.727 m), weight 59.4 kg (131 lb).  Body mass index is 19.92 kg/m².    PHYSICAL EXAM-  APPEARANCE: Well appearing, in no acute distress.   NECK: Neck symmetric.  CV:  Normal rate   PULM: Normal resp rate, no resp distress, normal resp effort   PSYCH:  Normal mood and affect, cooperative   SKIN: No visible rashes, lesions, or abnormal bruising   LYMPH: No inguinal adenopathy   ABD: Soft, without tenderness or masses.    BREAST: deferred/ declined  PELVIC:  VULVA:Normal female genitalia. Left resolving folliculitis-no evidence of infection and nearly healed now  URETHRAL MEATUS: No masses, no significant " prolapse.   BLADDER/ URETHRA: No masses or suprapubic tenderness   VAGINA/ CUFF: No lesions.  +atrophic changes. No discharge   PELVIS: No masses, tenderness, or fullness on bimanual exam   ANUS/ PERINEUM: Normal tone.  No lesions.     *female chaperone present for entire exam      ASSESSMENT/ PLAN:  Encounter for well woman exam with routine gynecological exam  -     Liquid-based pap smear, screening    Breast cancer screening by mammogram  -     Mammo Digital Screening Bilat w/ [facility]; Future; Expected date: 12/21/2023    Menopausal state      FOLLOWUP:  WWE or sooner prn

## 2023-12-07 ENCOUNTER — OFFICE VISIT (OUTPATIENT)
Dept: OBSTETRICS AND GYNECOLOGY | Facility: CLINIC | Age: 63
End: 2023-12-07
Payer: MEDICARE

## 2023-12-07 VITALS
HEIGHT: 68 IN | SYSTOLIC BLOOD PRESSURE: 147 MMHG | BODY MASS INDEX: 19.85 KG/M2 | WEIGHT: 131 LBS | DIASTOLIC BLOOD PRESSURE: 84 MMHG

## 2023-12-07 DIAGNOSIS — Z12.31 BREAST CANCER SCREENING BY MAMMOGRAM: ICD-10-CM

## 2023-12-07 DIAGNOSIS — N95.1 MENOPAUSAL STATE: ICD-10-CM

## 2023-12-07 DIAGNOSIS — Z01.419 ENCOUNTER FOR WELL WOMAN EXAM WITH ROUTINE GYNECOLOGICAL EXAM: Primary | ICD-10-CM

## 2023-12-07 PROCEDURE — G0101 PR CA SCREEN;PELVIC/BREAST EXAM: ICD-10-PCS | Mod: S$GLB,,, | Performed by: OBSTETRICS & GYNECOLOGY

## 2023-12-07 PROCEDURE — G0101 CA SCREEN;PELVIC/BREAST EXAM: HCPCS | Mod: S$GLB,,, | Performed by: OBSTETRICS & GYNECOLOGY

## 2023-12-07 RX ORDER — CEVIMELINE HYDROCHLORIDE 30 MG/1
CAPSULE ORAL
COMMUNITY
Start: 2023-10-24

## 2023-12-07 RX ORDER — ATORVASTATIN CALCIUM 20 MG/1
TABLET, FILM COATED ORAL
COMMUNITY
Start: 2023-09-26

## 2023-12-07 RX ORDER — CHLORHEXIDINE GLUCONATE ORAL RINSE 1.2 MG/ML
SOLUTION DENTAL
COMMUNITY
Start: 2023-11-13

## 2023-12-11 LAB — Lab: NORMAL

## 2024-04-25 ENCOUNTER — TELEPHONE (OUTPATIENT)
Dept: OBSTETRICS AND GYNECOLOGY | Facility: CLINIC | Age: 64
End: 2024-04-25
Payer: MEDICARE

## 2024-04-25 NOTE — TELEPHONE ENCOUNTER
----- Message from Bree Daigle sent at 4/25/2024  1:38 PM CDT -----  Contact: self  Patient is requesting a call back regarding requesting order for mammogram. Please call back at [phone]

## 2024-04-25 NOTE — TELEPHONE ENCOUNTER
Spoke with patient. She requested to have her mammogram order sent to [facility]. Order faxed to centralized scheduling. Patient will call them to schedule.

## 2024-06-04 ENCOUNTER — PATIENT MESSAGE (OUTPATIENT)
Dept: OBSTETRICS AND GYNECOLOGY | Facility: CLINIC | Age: 64
End: 2024-06-04
Payer: MEDICARE

## 2024-06-04 NOTE — TELEPHONE ENCOUNTER
For unknown reasons, when I went to the [employer] system the images are there but there is no report yet. Can we call the breast navigation nurse and see why this hasn't been read and reported yet?

## 2024-07-03 ENCOUNTER — DOCUMENTATION ONLY (OUTPATIENT)
Dept: OBSTETRICS AND GYNECOLOGY | Facility: CLINIC | Age: 64
End: 2024-07-03
Payer: MEDICARE

## 2024-12-12 ENCOUNTER — OFFICE VISIT (OUTPATIENT)
Dept: OBSTETRICS AND GYNECOLOGY | Facility: CLINIC | Age: 64
End: 2024-12-12
Payer: MEDICARE

## 2024-12-12 VITALS
DIASTOLIC BLOOD PRESSURE: 89 MMHG | BODY MASS INDEX: 21.82 KG/M2 | HEIGHT: 68 IN | SYSTOLIC BLOOD PRESSURE: 148 MMHG | WEIGHT: 144 LBS

## 2024-12-12 DIAGNOSIS — Z13.820 ENCOUNTER FOR OSTEOPOROSIS SCREENING IN ASYMPTOMATIC POSTMENOPAUSAL PATIENT: ICD-10-CM

## 2024-12-12 DIAGNOSIS — Z78.0 ENCOUNTER FOR OSTEOPOROSIS SCREENING IN ASYMPTOMATIC POSTMENOPAUSAL PATIENT: ICD-10-CM

## 2024-12-12 DIAGNOSIS — Z12.31 BREAST CANCER SCREENING BY MAMMOGRAM: Primary | ICD-10-CM

## 2024-12-12 DIAGNOSIS — Z01.419 ENCOUNTER FOR WELL WOMAN EXAM WITH ROUTINE GYNECOLOGICAL EXAM: ICD-10-CM

## 2024-12-12 PROCEDURE — 99203 OFFICE O/P NEW LOW 30 MIN: CPT | Mod: S$PBB,,, | Performed by: OBSTETRICS & GYNECOLOGY

## 2024-12-12 RX ORDER — TIRZEPATIDE 7.5 MG/.5ML
7.5 INJECTION, SOLUTION SUBCUTANEOUS
COMMUNITY
Start: 2025-01-06 | End: 2025-02-03

## 2024-12-12 NOTE — PROGRESS NOTES
"FOLLOW UP VISIT - menop/ hyst with [facility]  Patient Care Team:  Brian Quintana MD as PCP - General (Family Medicine)  Ana Akbar MD as Consulting Physician (Gastroenterology)    CC:  breast check, menopausal state,   HPI:  Pt is a 64 y.o.  who is here for her breast check    The patient's last visit with me was on 2023 for a problem visit    REVIEW OF PRIOR DATA/ HEALTH MAINTENANCE:  LAST ANNUAL:   2023    LAST [facility]- May 2024-  [facility]    LAST COLONOSCOPY- - by Dr. Akbar - q 3 yrs for polyps   LAST DEXA- never    HX ABNL PAPS: none    HYST for bleeding and fibroids in   HRT:  never used systemic hrt    Past Medical History:   Diagnosis Date    Asthma     Atrophic vaginitis     insuff paps- used intrarosa    Colon polyps     DM (diabetes mellitus)     GERD (gastroesophageal reflux disease)     HTN (hypertension)     Hypercholesterolemia     Menopausal state     hyst/bso    Psoriasis      SURGICAL HX:   has a past surgical history that includes Ovarian cyst removal (); Ear surgery, unspecified (Right, ); Cholecystectomy; Dilation and curettage of uterus; Bilateral tubal ligation (); Laparoscopy-assisted vaginal hysterectomy (); and Breast biopsy ().  Family, obstetric, and social history reviewed and updated    Current Outpatient Medications   Medication Sig    atorvastatin (LIPITOR) 20 MG tablet     losartan (COZAAR) 50 MG tablet Take 50 mg by mouth once daily.    omeprazole (PRILOSEC) 40 MG capsule Take 40 mg by mouth once daily.    [START ON 2025] tirzepatide (MOUNJARO) 7.5 mg/0.5 mL PnIj Inject 7.5 mg into the skin.     No current facility-administered medications for this visit.     VITALS:  Blood pressure (!) 148/89, height 5' 8" (1.727 m), weight 65.3 kg (144 lb).  Body mass index is 21.9 kg/m².    PHYSICAL EXAM-  APPEARANCE: Well appearing, in no acute distress.   NECK: Neck symmetric   CV/PULM: No resp distress, normal resp effort   PSYCH:  " Normal mood and affect, cooperative   SKIN: No rashes, lesions, or abnormal bruising   ABD: Soft, without tenderness or masses.    BREAST: No skin changes or nipple dc.  No palpable masses or tenderness  PELVIC:  VULVA: Normal female genitalia. No lesions.   URETHRAL MEATUS: No masses, no significant prolapse.  BLADDER/ URETHRA: No masses or suprapubic tenderness   VAGINA/ CUFF: No lesions. +atrophic changes. No discharge   PELVIS: No masses, tenderness, or fullness on bimanual exam   ANUS/ PERINEUM: Normal tone.  No lesions.          *patient verbally consented to exam and female chaperone present for entire exam     ASSESSMENT/ PLAN:  Breast cancer screening by mammogram  -     Mammo Digital Screening Bilat w/ [facility]; Future; Expected date: 12/25/2024    Encounter for osteoporosis screening in asymptomatic postmenopausal patient  -     DXA Bone Density Axial Skeleton 1 or more sites; Future; Expected date: 12/25/2024    FOLLOWUP:  [facility] or sooner prn

## 2025-05-21 ENCOUNTER — TELEPHONE (OUTPATIENT)
Dept: OBSTETRICS AND GYNECOLOGY | Facility: CLINIC | Age: 65
End: 2025-05-21
Payer: MEDICARE

## 2025-05-21 NOTE — TELEPHONE ENCOUNTER
----- Message from Karissa sent at 5/21/2025  8:37 AM CDT -----  Contact: Chantelle  .Type: Orders RequestWhat orders/ testing are being requested? Annual screening mammogram - [facility]/ [address] Pt can be reached at [phone]. Is there a future appointment scheduled for the patient with PCP?When?Would you prefer a response via [facility]?Comments:

## 2025-05-21 NOTE — TELEPHONE ENCOUNTER
Returned call to patient. Informed her that her mammogram and bone density orders has been sent to [facility], per her request. Also conformed patient's appointment in 1/2026.

## 2025-06-18 ENCOUNTER — RESULTS FOLLOW-UP (OUTPATIENT)
Dept: OBSTETRICS AND GYNECOLOGY | Facility: CLINIC | Age: 65
End: 2025-06-18

## 2025-08-23 ENCOUNTER — PATIENT MESSAGE (OUTPATIENT)
Dept: RESEARCH | Facility: HOSPITAL | Age: 65
End: 2025-08-23
Payer: MEDICARE